# Patient Record
Sex: MALE | URBAN - METROPOLITAN AREA
[De-identification: names, ages, dates, MRNs, and addresses within clinical notes are randomized per-mention and may not be internally consistent; named-entity substitution may affect disease eponyms.]

---

## 2024-10-22 ENCOUNTER — TELEPHONE (OUTPATIENT)
Dept: PAIN MEDICINE | Facility: CLINIC | Age: 74
End: 2024-10-22

## 2024-10-22 NOTE — TELEPHONE ENCOUNTER
Staff tried reaching patient to get him scheduled with Dr. Yusuf. Patient phone is off and is not on portal. Staff was unable to reach patient

## 2024-10-22 NOTE — TELEPHONE ENCOUNTER
----- Message from Masoud Carrera sent at 10/22/2024 10:13 AM CDT -----  Please reach out to patient to schedule NP appointment. The referral is for [facility], see if they are willing to wait. If not, schedule with another provider; Ana Umana, or Damari.     Let me know the date and time of the appt once scheduled.

## 2024-10-22 NOTE — TELEPHONE ENCOUNTER
----- Message from Masoud Carrera sent at 10/22/2024  1:17 PM CDT -----  Try this number, [phone]  ----- Message -----  From: Dar Lee MA  Sent: 10/22/2024  10:53 AM CDT  To: Deandre Duron MA    Patient phone is off and he is not on the portal. He don't have no other number I can reach him on.  ----- Message -----  From: Deandre Duron MA  Sent: 10/22/2024  10:14 AM CDT  To: Dar Lee MA    Please reach out to patient to schedule NP appointment. The referral is for [facility], see if they are willing to wait. If not, schedule with another provider; Ana Umana, or Damari.     Let me know the date and time of the appt once scheduled.